# Patient Record
(demographics unavailable — no encounter records)

---

## 2024-12-11 NOTE — PHYSICAL EXAM
[Declined] : declined [FreeTextEntry3] : Focused skin exam performed  The relevant portions of the exam were performed today, notable for:  skin clear no nail changes

## 2024-12-11 NOTE — ASSESSMENT
[FreeTextEntry1] : 1. Psoriasis, severe, ~10% BSA previously now BSA <1% - chronic, improved signif and clear on Ilumya Illumya started May 20 2024 - completely clear today after completing loading dose - Diagnosis, chronic nature, disease course, treatment options and goals of therapy discussed - CONTINUE Illumya 100mg q12 weeks  injected SQ into R upper lateral arm in office today  (LOT: [id number], EXP: 01/2027)  - C/w clobetasol ointment BID PRN x2 weeks on, 1 week off cycles to extremities. SED, avoid on face/groin. - C/w elidel 1% cream BID to gluteal cleft and elsewhere  2. High risk medication use - Reviewed CBC w diff, CMP, Hepatitis B and C serologies, HIV, Quant from 5/2024, WNL   RTC 12 weeks

## 2024-12-11 NOTE — HISTORY OF PRESENT ILLNESS
[FreeTextEntry1] : RPV Ilumya - psoriasis [de-identified] : JACQUI SEGUNDO is a 74 year-old male who presents for:  #Psoriasis - extremities, gluteal cleft x years Previously on otezla for PsA with mild skin disease. Early 2024 was told he doesn't have PsA, and came off of otezla. Unable to get skyrizi and cosentyx with insurance, also not eligibile for PAP - 5/20/24: started Ilumya 1st loading dose - 06/17/24 : Here for 2nd loading dose of Ilumya, maybe mildly improved on legs, using clobetasol 1 week on and off week using elidel  - 09/09/24: here for Ilumya inj. Skin completely clear today. Notes improvement in joint pain since starting - but Rheum told him he has OA, not PsA.  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

## 2025-01-23 NOTE — PHYSICAL EXAM
[Full Body Skin Exam Performed] : performed [FreeTextEntry3] : Skin examination performed of the face, neck, trunk, arms, legs;  The patient is well, alert and oriented, pleasant and cooperative. Eyelids, conjunctivae, oral mucosa, digits and nails all normal.   No cervical adenopathy.  Normal findings include:  Seborrheic keratoses- multiple on trunk also scalp Multiple benign nevi were noted.  Angiomas Lentigines Psoriatic plaques resolved with PIH:  Knees, elbows; dorsal hands, lower legs;   No lesions were suspicious for malignancy.

## 2025-01-23 NOTE — ASSESSMENT
[FreeTextEntry1] : Complete skin examination is negative for malignancy; Multiple new concerns were addressed and discussed. Therapeutic options and their risks and benefits; along with multiple diagnostic possibilities were discussed at length; risks and benefits of skin biopsy and/or other further study were discussed;  Continue regular exams; Follow up for TBSE in 1 year  Psoriasis; excellent control on Ilumya-  tolerating well; continue injections q 3 months;  has clobetasol ointment prn for spot treatments;

## 2025-01-23 NOTE — HISTORY OF PRESENT ILLNESS
[de-identified] : Pt. presents for skin check; c/o few spots of concern;   Severity:  mild   Modifying factors:  none Associated symptoms:  none Context:  no association with activity Has Psoriasis and PsA; uses topicals only;  history of Otezla use in past; d/c'd 2' cost  On Ilumya q 12 weeks since 5/2024, has injected in office at [facility] with good results

## 2025-03-12 NOTE — HISTORY OF PRESENT ILLNESS
[FreeTextEntry1] : RPV Ilumya - psoriasis [de-identified] : JACQUI SEGUNDO is a 74 year-old male who presents for:  #Psoriasis - extremities, gluteal cleft x years Previously on otezla for PsA with mild skin disease. Early 2024 was told he doesn't have PsA, and came off of otezla. Unable to get skyrizi and cosentyx with insurance, also not eligibile for PAP - 5/20/24: started Ilumya 1st loading dose - 06/17/24 : Here for 2nd loading dose of Ilumya, maybe mildly improved on legs, using clobetasol 1 week on and off week using elidel  - 09/09/24: here for Ilumya inj. Skin completely clear today. Notes improvement in joint pain since starting - but Rheum told him he has OA, not PsA.  - 12/11/24: doing well, here for Ilumya  - 3/12/25: doing well, here for Ilumya; pt having persistent stomache-ache every few days since Otezla, but stopped Otezla years ago; 1 doctor previously said it was persistent from Otezla but this would not persist so long.  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

## 2025-03-12 NOTE — HISTORY OF PRESENT ILLNESS
[FreeTextEntry1] : RPV Ilumya - psoriasis [de-identified] : JACQUI SEGUNDO is a 74 year-old male who presents for:  #Psoriasis - extremities, gluteal cleft x years Previously on otezla for PsA with mild skin disease. Early 2024 was told he doesn't have PsA, and came off of otezla. Unable to get skyrizi and cosentyx with insurance, also not eligibile for PAP - 5/20/24: started Ilumya 1st loading dose - 06/17/24 : Here for 2nd loading dose of Ilumya, maybe mildly improved on legs, using clobetasol 1 week on and off week using elidel  - 09/09/24: here for Ilumya inj. Skin completely clear today. Notes improvement in joint pain since starting - but Rheum told him he has OA, not PsA.  - 12/11/24: doing well, here for Ilumya  - 3/12/25: doing well, here for Ilumya; pt having persistent stomache-ache every few days since Otezla, but stopped Otezla years ago; 1 doctor previously said it was persistent from Otezla but this would not persist so long.  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

## 2025-03-12 NOTE — ASSESSMENT
[FreeTextEntry1] : 1. Psoriasis, severe, ~10% BSA previously now BSA <1% - chronic, improved signif and clear on Ilumya Illumya started May 20 2024 - completely clear today after completing loading dose - Diagnosis, chronic nature, disease course, treatment options and goals of therapy discussed - Discussed spacing out injections, pt doing very well - Space out Ilumya 100mg to every 4-6 months if possible injected SQ into L upper lateral arm in office today  (LOT: [id number], EXP: 03/2027)  - C/w clobetasol ointment BID PRN x2 weeks on, 1 week off cycles to extremities. SED, avoid on face/groin. - C/w elidel 1% cream BID to gluteal cleft and elsewhere - Stomach-ache's are not associated with Ilumya and should not be residual from his prior Otezla use; after thorough history, pt has a dull aching pain that comes and goes that correlates with his lower back pain flares. We would favor referred pain and pt should see his PCP or pain management for additional management  2. High risk medication use - Reviewed CBC w diff, CMP, Hepatitis B and C serologies, HIV, Quant from 5/2024, WNL  - repeat CBC/CMP/quant before next visit, ordered today  RTC 4 months

## 2025-03-12 NOTE — ASSESSMENT
[FreeTextEntry1] : 1. Psoriasis, severe, ~10% BSA previously now BSA <1% - chronic, improved signif and clear on Ilumya Illumya started May 20 2024 - completely clear today after completing loading dose - Diagnosis, chronic nature, disease course, treatment options and goals of therapy discussed - Discussed spacing out injections, pt doing very well - Space out Ilumya 100mg to every 4-6 months if possible injected SQ into L upper lateral arm in office today  (LOT: [id number], EXP: 03/2027)  - C/w clobetasol ointment BID PRN x2 weeks on, 1 week off cycles to extremities. SED, avoid on face/groin. - C/w elidel 1% cream BID to gluteal cleft and elsewhere - Stomach-ache's are not associated with Ilumya and should not be residual from his prior Otezla use; after thorough history, pt has a dull aching pain that comes and goes that correlates with his lower back pain flares. We would favor referred pain and pt should see his PCP or pain management for additional management  2. High risk medication use - Reviewed CBC w diff, CMP, Hepatitis B and C serologies, HIV, Quant from 5/2024, WNL  - repeat CBC/CMP/quant before next visit, ordered today  RTC 4 months

## 2025-07-09 NOTE — ASSESSMENT
[FreeTextEntry1] : 1. Psoriasis, severe- chronic, focal flare since spacing out Ilumya injections to [id number]. Overall skin previously improved signif and clear on Ilumya [id number] Illumya started May 20 2024.  - Diagnosis, chronic nature, disease course, treatment options and goals of therapy discussed  - Go back to Ilumya 100mg every 3 months.  Injected SQ into R upper lateral arm in office today  LOT: [id number] EXP: 03/2027 - WILL TRY TO SWITCH TO SKYRIZI or TREMFYA given new medicare coverage plan. If not possible, continue Ilumya as above - C/w clobetasol ointment BID PRN x2 weeks on, 1 week off cycles to extremities. SED, avoid on face/groin. - C/w elidel 1% cream BID to gluteal cleft and elsewhere - Of note, pt sees PT for abdominal pain correlated to his lower back pain flares, ?referred pain  2. High risk medication use - Reviewed CBC w diff, CMP, Hepatitis B and C serologies, HIV, Quant from 5/2024, WNL  - CBC/CMP wnl 5/2025; negative quant gold TB 5/2025  RTC 3 months

## 2025-07-09 NOTE — PHYSICAL EXAM
[Declined] : declined [FreeTextEntry3] : Focused skin exam performed  The relevant portions of the exam were performed today, notable for:  psoriatic thin plaques dorsal hands and LLE

## 2025-07-09 NOTE — HISTORY OF PRESENT ILLNESS
[FreeTextEntry1] : RPV Ilumya - psoriasis [de-identified] : JACQUI SEGUNDO is a 74 year-old male who presents for:  #Psoriasis - extremities, gluteal cleft x years Previously on otezla for PsA with mild skin disease. Early 2024 was told he doesn't have PsA, and came off of otezla. Unable to get skyrizi and cosentyx with insurance, also not eligibile for PAP - 5/20/24: started Ilumya 1st loading dose - 06/17/24 : Here for 2nd loading dose of Ilumya, maybe mildly improved on legs, using clobetasol 1 week on and off week using elidel  - 09/09/24: here for Ilumya inj. Skin completely clear today. Notes improvement in joint pain since starting - but Rheum told him he has OA, not PsA.  - 12/11/24: doing well, here for Ilumya  - 3/12/25: doing well, here for Ilumya; pt having persistent stomache-ache every few days since Otezla, but stopped Otezla years ago; 1 doctor previously said it was persistent from Otezla but this would not persist so long. - 7/9/25: at , reduced frequency to 4 months for Ilumya. 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